# Patient Record
Sex: FEMALE | Race: WHITE
[De-identification: names, ages, dates, MRNs, and addresses within clinical notes are randomized per-mention and may not be internally consistent; named-entity substitution may affect disease eponyms.]

---

## 2021-09-27 ENCOUNTER — HOSPITAL ENCOUNTER (OUTPATIENT)
Dept: HOSPITAL 53 - M RAD | Age: 56
End: 2021-09-27
Attending: STUDENT IN AN ORGANIZED HEALTH CARE EDUCATION/TRAINING PROGRAM
Payer: COMMERCIAL

## 2021-09-27 DIAGNOSIS — R92.2: Primary | ICD-10-CM

## 2021-09-27 NOTE — REP
INDICATION:

BREAST LUMP, FAMILY HX. Equivocal conventional breast imaging.



COMPARISON:

Comparison mammography and sonography are reviewed from August 18, 2021.



TECHNIQUE:

Three Kary MRI imaging was performed with a dedicated breast coil.  Axial, coronal,

and sagittal T1 and T2 weighted scans were obtained with and without fat saturation in

the usual fashion.  The study includes dynamically acquired post gadolinium-enhanced

imaging with image subtraction.  Maximum intensity projection and multi planar

reformation imaging is included as well.  This study is interpreted with the aid of

[employer] EDI, an FDA approved computer aided detection (CAD) software program, on a

dedicated breast MRI workstation.  The gadolinium enhancement dose is 13 mL of

intravenous ProHance.



FINDINGS:



There is a moderate amount of fibroglandular tissue bilaterally corresponding with the

mammographic pattern.  There is mild background parenchymal enhancement.  There is no

evidence of axillary lymphadenopathy or significant breast cystic change.

High-resolution pre and post-contrast T1 and T2 weighted scans show no suspicious

morphologic abnormality in either breast. Dynamically acquired sequential postcontrast

images show no suspicious area of enhancement and washout kinetics in either breast to

suggest malignancy. Subtraction images show no additional abnormality.



There are 2 small cysts adjacent to 1 another in the right lobe of the liver

anteriorly.  There is a focal magnetic field susceptibility artifact in the left

superior breast at approximately 11-12 o'clock corresponding to the mammogram evidence

of a metallic clip.  The inferior and medial aspect of the left breast is unremarkable

on MR. No mass lesion is seen.



IMPRESSION:

BI-RADS category 2 benign bilateral breast MRI findings.





<Electronically signed by Azam Xinog > 09/27/21 [id number]